# Patient Record
Sex: FEMALE | Race: WHITE | NOT HISPANIC OR LATINO
[De-identification: names, ages, dates, MRNs, and addresses within clinical notes are randomized per-mention and may not be internally consistent; named-entity substitution may affect disease eponyms.]

---

## 2019-05-02 ENCOUNTER — TRANSCRIPTION ENCOUNTER (OUTPATIENT)
Age: 49
End: 2019-05-02

## 2019-05-22 ENCOUNTER — TRANSCRIPTION ENCOUNTER (OUTPATIENT)
Age: 49
End: 2019-05-22

## 2019-06-04 ENCOUNTER — TRANSCRIPTION ENCOUNTER (OUTPATIENT)
Age: 49
End: 2019-06-04

## 2019-12-16 ENCOUNTER — TRANSCRIPTION ENCOUNTER (OUTPATIENT)
Age: 49
End: 2019-12-16

## 2019-12-31 ENCOUNTER — TRANSCRIPTION ENCOUNTER (OUTPATIENT)
Age: 49
End: 2019-12-31

## 2020-07-24 DIAGNOSIS — Z01.818 ENCOUNTER FOR OTHER PREPROCEDURAL EXAMINATION: ICD-10-CM

## 2020-07-26 ENCOUNTER — APPOINTMENT (OUTPATIENT)
Dept: DISASTER EMERGENCY | Facility: CLINIC | Age: 50
End: 2020-07-26

## 2020-07-27 LAB — SARS-COV-2 N GENE NPH QL NAA+PROBE: NOT DETECTED

## 2020-08-30 ENCOUNTER — TRANSCRIPTION ENCOUNTER (OUTPATIENT)
Age: 50
End: 2020-08-30

## 2021-06-30 ENCOUNTER — TRANSCRIPTION ENCOUNTER (OUTPATIENT)
Age: 51
End: 2021-06-30

## 2022-07-04 ENCOUNTER — FORM ENCOUNTER (OUTPATIENT)
Age: 52
End: 2022-07-04

## 2022-07-11 ENCOUNTER — APPOINTMENT (OUTPATIENT)
Dept: ORTHOPEDIC SURGERY | Facility: CLINIC | Age: 52
End: 2022-07-11

## 2022-07-11 VITALS — BODY MASS INDEX: 24.94 KG/M2 | HEIGHT: 60 IN | WEIGHT: 127 LBS

## 2022-07-11 DIAGNOSIS — C50.919 MALIGNANT NEOPLASM OF UNSPECIFIED SITE OF UNSPECIFIED FEMALE BREAST: ICD-10-CM

## 2022-07-11 DIAGNOSIS — Z91.09 OTHER ALLERGY STATUS, OTHER THAN TO DRUGS AND BIOLOGICAL SUBSTANCES: ICD-10-CM

## 2022-07-11 DIAGNOSIS — Z86.59 PERSONAL HISTORY OF OTHER MENTAL AND BEHAVIORAL DISORDERS: ICD-10-CM

## 2022-07-11 DIAGNOSIS — Z78.9 OTHER SPECIFIED HEALTH STATUS: ICD-10-CM

## 2022-07-11 DIAGNOSIS — D80.1 NONFAMILIAL HYPOGAMMAGLOBULINEMIA: ICD-10-CM

## 2022-07-11 PROCEDURE — 20610 DRAIN/INJ JOINT/BURSA W/O US: CPT

## 2022-07-11 PROCEDURE — 73560 X-RAY EXAM OF KNEE 1 OR 2: CPT | Mod: RT

## 2022-07-11 PROCEDURE — 99213 OFFICE O/P EST LOW 20 MIN: CPT | Mod: 25

## 2022-07-11 RX ORDER — POTASSIUM CHLORIDE 750 MG/1
10 TABLET, FILM COATED, EXTENDED RELEASE ORAL
Qty: 150 | Refills: 0 | Status: COMPLETED | COMMUNITY
Start: 2021-11-10

## 2022-07-11 RX ORDER — GABAPENTIN 300 MG/1
300 CAPSULE ORAL
Qty: 30 | Refills: 0 | Status: ACTIVE | COMMUNITY
Start: 2022-04-18

## 2022-07-11 RX ORDER — LEUPROLIDE ACETATE 7.5 MG
7.5 KIT INTRAMUSCULAR
Qty: 1 | Refills: 0 | Status: ACTIVE | COMMUNITY
Start: 2022-05-10

## 2022-07-11 RX ORDER — DIAZEPAM 10 MG/1
10 TABLET ORAL
Qty: 90 | Refills: 0 | Status: ACTIVE | COMMUNITY
Start: 2022-07-07

## 2022-07-11 RX ORDER — ZOLPIDEM TARTRATE 10 MG/1
10 TABLET ORAL
Qty: 30 | Refills: 0 | Status: ACTIVE | COMMUNITY
Start: 2022-03-28

## 2022-07-11 RX ORDER — ANASTROZOLE TABLETS 1 MG/1
1 TABLET ORAL
Qty: 90 | Refills: 0 | Status: ACTIVE | COMMUNITY
Start: 2022-02-08

## 2022-07-11 RX ORDER — POTASSIUM CHLORIDE 1500 MG/1
20 TABLET, FILM COATED, EXTENDED RELEASE ORAL
Qty: 120 | Refills: 0 | Status: ACTIVE | COMMUNITY
Start: 2022-06-17

## 2022-07-11 RX ORDER — HYDROXYZINE HYDROCHLORIDE 50 MG/1
50 TABLET ORAL
Qty: 60 | Refills: 0 | Status: COMPLETED | COMMUNITY
Start: 2022-04-11

## 2022-07-11 RX ORDER — ZIPRASIDONE HYDROCHLORIDE 80 MG/1
80 CAPSULE ORAL
Qty: 30 | Refills: 0 | Status: ACTIVE | COMMUNITY
Start: 2022-03-28

## 2022-07-11 RX ORDER — BUPROPION HYDROCHLORIDE 300 MG/1
300 TABLET, EXTENDED RELEASE ORAL
Qty: 30 | Refills: 0 | Status: ACTIVE | COMMUNITY
Start: 2022-05-24

## 2022-07-11 RX ORDER — DENOSUMAB 60 MG/ML
60 INJECTION SUBCUTANEOUS
Qty: 1 | Refills: 0 | Status: ACTIVE | COMMUNITY
Start: 2021-10-19

## 2022-07-11 RX ORDER — METHOCARBAMOL 500 MG/1
500 TABLET, FILM COATED ORAL
Qty: 20 | Refills: 0 | Status: ACTIVE | COMMUNITY
Start: 2022-04-01

## 2022-07-11 RX ORDER — OXYCODONE AND ACETAMINOPHEN 5; 325 MG/1; MG/1
5-325 TABLET ORAL
Qty: 30 | Refills: 0 | Status: COMPLETED | COMMUNITY
Start: 2022-04-01

## 2022-07-11 RX ORDER — ONDANSETRON 4 MG/1
4 TABLET, ORALLY DISINTEGRATING ORAL
Qty: 9 | Refills: 0 | Status: COMPLETED | COMMUNITY
Start: 2022-03-16

## 2022-07-11 RX ORDER — FLUOXETINE HYDROCHLORIDE 40 MG/1
40 CAPSULE ORAL
Qty: 30 | Refills: 0 | Status: ACTIVE | COMMUNITY
Start: 2022-03-28

## 2022-07-11 NOTE — REASON FOR VISIT
[FreeTextEntry2] : Patient has noted Right knee pain for several yrs but recently worse over the past 3 weeks after she started a move.\par She notes pain med/lateral aspect of the RT knee. She states she did fall onto the knee 2 weeks ago. She saw [facility], was sent for xrays and MRI of the knee. She tried knee sleeve

## 2022-07-11 NOTE — PROCEDURE
[Right] : of the right [Knee] : knee [Pain] : pain [Inflammation] : inflammation [X-ray evidence of Osteoarthritis on this or prior visit] : x-ray evidence of Osteoarthritis on this or prior visit [Betadine] : betadine [Ethyl Chloride sprayed topically] : ethyl chloride sprayed topically [Sterile technique used] : sterile technique used [___ cc    6mg] :  Betamethasone (Celestone) ~Vcc of 6mg [___ cc    1%] : Lidocaine ~Vcc of 1%  [] : Patient tolerated procedure well [Call if redness, pain or fever occur] : call if redness, pain or fever occur [Apply ice for 15min out of every hour for the next 12-24 hours as tolerated] : apply ice for 15 minutes out of every hour for the next 12-24 hours as tolerated [Patient was advised to rest the joint(s) for ____ days] : patient was advised to rest the joint(s) for [unfilled] days [Previous OTC use and PT nontherapeutic] : patient has tried OTC's including aspirin, Ibuprofen, Aleve, etc or prescription NSAIDS, and/or exercises at home and/or physical therapy without satisfactory response [Patient had decreased mobility in the joint] : patient had decreased mobility in the joint [Risks, benefits, alternatives discussed / Verbal consent obtained] : the risks benefits, and alternatives have been discussed, and verbal consent was obtained

## 2022-07-11 NOTE — PHYSICAL EXAM
[NL (0)] : extension 0 degrees [5___] : hamstring 5[unfilled]/5 [Negative] : negative Murali's [] : mildly antalgic [Right] : right knee [AP] : anteroposterior [Lateral] : lateral [[place]] : skyline [Degenerative change] : Degenerative change [AVN] : AVN [TWNoteComboBox7] : flexion 125 degrees

## 2022-07-11 NOTE — ASSESSMENT
[FreeTextEntry1] : Options were discussed with patient including PT, CSI. The plan at this time is to go forward with CSI in the right kNEE. Patient tolerated procedure well. \par \par f/u 1 month\par \par Entered by Bradly Justice acting as scribe.\par Dr. Patel Attestation\par The documentation recorded by the scribe, in my presence, accurately reflects the service I, Dr. Patel, personally performed, and the decisions made by me with my edits as appropriate.\par

## 2022-07-11 NOTE — HISTORY OF PRESENT ILLNESS
[Gradual] : gradual [10] : 10 [8] : 8 [Burning] : burning [Localized] : localized [Stabbing] : stabbing [Throbbing] : throbbing [Constant] : constant [Household chores] : household chores [Leisure] : leisure [Work] : work [Sleep] : sleep [Nothing helps with pain getting better] : Nothing helps with pain getting better [Standing] : standing [Walking] : walking [Stairs] : stairs [Disabled] : Work status: disabled [de-identified] : PT is following up on RT knee pain. It started up again over the past 2 weeks. NKI. Has pain with bending knees, walking, stairs. Had good relief from CSI at last apt 7/19/2021 [] : no [FreeTextEntry1] : RT knee

## 2022-07-25 ENCOUNTER — APPOINTMENT (OUTPATIENT)
Dept: ORTHOPEDIC SURGERY | Facility: CLINIC | Age: 52
End: 2022-07-25

## 2022-07-25 ENCOUNTER — TRANSCRIPTION ENCOUNTER (OUTPATIENT)
Age: 52
End: 2022-07-25

## 2022-07-25 PROCEDURE — 99213 OFFICE O/P EST LOW 20 MIN: CPT

## 2022-07-25 RX ORDER — TRAMADOL HYDROCHLORIDE 50 MG/1
50 TABLET, COATED ORAL DAILY
Qty: 10 | Refills: 0 | Status: COMPLETED | COMMUNITY
Start: 2022-07-25 | End: 2022-08-04

## 2022-07-25 NOTE — ASSESSMENT
[FreeTextEntry1] :  Lengthy discussion regarding options was had with the patient. Nonsurgical options including but not limited to cortisone,viscosupplementation, anti-inflammatory medications, activity modification, non impact exercise /maintaining a healthy BMI, bracing, and icing were reviewed. Surgical options including but not limited to arthroscopy, and joint replacement were discussed as was risks, benefits and alternatives. All questions were answered. \par \par At this time, I rec that the patient thinks about going forward with TKA of the right knee after previously unsuccessful injections. I advised patient to f/u with Dr. Camarena or Dr. Hyatt to further discuss going forward with Rt. TKA\par \par f/u 1 month\par \par Entered by Bradly Justice acting as scribe.\par Dr. Patel Attestation\par The documentation recorded by the scribe, in my presence, accurately reflects the service I, Dr. Patel, personally performed, and the decisions made by me with my edits as appropriate.\par

## 2022-07-25 NOTE — PHYSICAL EXAM
[NL (0)] : extension 0 degrees [5___] : hamstring 5[unfilled]/5 [Negative] : negative Murali's [Right] : right knee [AP] : anteroposterior [Lateral] : lateral [Ong] : skyline [Degenerative change] : Degenerative change [AVN] : AVN [] : no deformities of patellar tendon [FreeTextEntry3] : Quad atrophy [TWNoteComboBox7] : flexion 125 degrees

## 2022-07-25 NOTE — HISTORY OF PRESENT ILLNESS
[Gradual] : gradual [10] : 10 [8] : 8 [Burning] : burning [Localized] : localized [Stabbing] : stabbing [Throbbing] : throbbing [Constant] : constant [Household chores] : household chores [Leisure] : leisure [Work] : work [Sleep] : sleep [Nothing helps with pain getting better] : Nothing helps with pain getting better [Standing] : standing [Walking] : walking [Stairs] : stairs [Disabled] : Work status: disabled [de-identified] : PT is following up on RT knee pain. It started up again over the past 2 weeks. NKI. Has pain with bending knees, walking, stairs. Did not have good relief from CSI on 7/11/22. Pain is 10/10 [] : no [FreeTextEntry1] : RT knee

## 2022-08-10 ENCOUNTER — APPOINTMENT (OUTPATIENT)
Dept: ORTHOPEDIC SURGERY | Facility: CLINIC | Age: 52
End: 2022-08-10

## 2022-08-10 PROCEDURE — 99214 OFFICE O/P EST MOD 30 MIN: CPT

## 2022-08-10 NOTE — PHYSICAL EXAM
[FreeTextEntry9] : ap/lat taken in office prior show AVN med/lateral femoral condyle. no significant joint space narrowing noted.

## 2022-08-10 NOTE — DISCUSSION/SUMMARY
[de-identified] : Options were discussed today including oral anti-inflammatories, Physical Therapy, Steroid Injection,hylagan injections or Surgery. The patient had time to ask questions of the different treatment options, including doing nothing and just observing for a finite period of time and re-evaluating in the future\par The Risks, benefits, alternatives and expectations of the proposed procedure, as well as non-operative management, were discussed at length with the patient, as well as the procedure itself and the expected recovery period. The possible need for post operative Physical Therapy was also discussed. The patient was allowed as much tome as necessary to ask all appropriate questions and they were answered to the patient's satisfaction.\par Plan is to proceed with the TKA.

## 2022-08-11 RX ORDER — HYDROCODONE BITARTRATE AND ACETAMINOPHEN 5; 325 MG/1; MG/1
5-325 TABLET ORAL
Qty: 30 | Refills: 0 | Status: ACTIVE | COMMUNITY
Start: 2022-08-11 | End: 1900-01-01

## 2022-08-17 ENCOUNTER — FORM ENCOUNTER (OUTPATIENT)
Age: 52
End: 2022-08-17

## 2022-08-22 ENCOUNTER — APPOINTMENT (OUTPATIENT)
Dept: ORTHOPEDIC SURGERY | Facility: CLINIC | Age: 52
End: 2022-08-22

## 2022-10-06 RX ORDER — HYDROCODONE BITARTRATE AND ACETAMINOPHEN 5; 325 MG/1; MG/1
5-325 TABLET ORAL
Qty: 30 | Refills: 0 | Status: ACTIVE | COMMUNITY
Start: 2022-10-06 | End: 1900-01-01

## 2022-11-04 ENCOUNTER — RESULT REVIEW (OUTPATIENT)
Age: 52
End: 2022-11-04

## 2022-11-06 ENCOUNTER — FORM ENCOUNTER (OUTPATIENT)
Age: 52
End: 2022-11-06

## 2022-11-09 ENCOUNTER — FORM ENCOUNTER (OUTPATIENT)
Age: 52
End: 2022-11-09

## 2022-11-15 ENCOUNTER — APPOINTMENT (OUTPATIENT)
Dept: ORTHOPEDIC SURGERY | Facility: HOSPITAL | Age: 52
End: 2022-11-15

## 2022-11-15 ENCOUNTER — RESULT REVIEW (OUTPATIENT)
Age: 52
End: 2022-11-15

## 2022-11-15 PROCEDURE — 27447 TOTAL KNEE ARTHROPLASTY: CPT | Mod: RT

## 2022-11-15 PROCEDURE — 27447 TOTAL KNEE ARTHROPLASTY: CPT | Mod: AS,RT

## 2022-11-15 PROCEDURE — 20985 CPTR-ASST DIR MS PX: CPT

## 2022-11-30 ENCOUNTER — APPOINTMENT (OUTPATIENT)
Dept: ORTHOPEDIC SURGERY | Facility: CLINIC | Age: 52
End: 2022-11-30

## 2022-11-30 PROCEDURE — 73560 X-RAY EXAM OF KNEE 1 OR 2: CPT | Mod: RT

## 2022-11-30 PROCEDURE — 99024 POSTOP FOLLOW-UP VISIT: CPT

## 2022-11-30 NOTE — HISTORY OF PRESENT ILLNESS
[de-identified] : following up for the right knee. Patient is s/p R TKA 11/15/2022.  Patient states the knee is still painful. She denies any fever chills or drainage from the knee.  She is ambulating with a walker.  She has been taking her ASA BID.  She is using the TEDS.  She has been working with PT at home.

## 2022-11-30 NOTE — PHYSICAL EXAM
[Right] : right knee [Components well fixed, in good position] : Components well fixed, in good position [de-identified] : Constitutional: The patient appears well developed, well nourished.  \par \par  \par \par Neurologic: Coordination is normal. Alert and oriented to time, place and person. No evidence of mood disorder, calm affect.  \par \par  \par \par    RIGHT   KNEE: Inspection of the knee is as follows: moderate effusion and small ecchymosis. no streaking, no erythema, no atrophy, no deformities of the quad tendon and no deformities of patellar tendon.  \par \par  \par \par Inspection of the wound reveals clean and dry incision, no fluctuance, no sign of infection, no erythema and no drainage. Mesh/Sutures were removed. \par \par  \par \par Palpation of the knee is as follows: no increased warmth. \par \par  \par \par Knee Range of Motion is as follows in degrees:   \par \par  \par \par Extension: 3 \par \par Flexion: 90  \par \par  \par \par Strength examination of the knee is as follows:  \par \par Quadriceps strength is 5/5  \par \par Hamstring strength is 5/5  \par \par  \par \par Ligament Stability and Special Test ligamentously stable and no varus or valgus instability. patella tracks well and able to do active straight leg raise without an extensor lag.  \par \par  \par \par Neurological examination of the knee is as follows: light touch is intact throughout.  \par \par  \par \par Gait and function is as follows: mildly antalgic gait.  WALKER\par \par

## 2022-11-30 NOTE — DISCUSSION/SUMMARY
[de-identified] : She had ?cellulitis medial thight Right knee in TCU which resolved. Wounds and knee look great. \par Continue PT push ROM use bone foam. She can stop the ASA and continue the TEDS for 2 more week. f/u in 6 weeks.

## 2022-12-07 ENCOUNTER — FORM ENCOUNTER (OUTPATIENT)
Age: 52
End: 2022-12-07

## 2022-12-18 ENCOUNTER — FORM ENCOUNTER (OUTPATIENT)
Age: 52
End: 2022-12-18

## 2022-12-24 RX ORDER — HYDROMORPHONE HYDROCHLORIDE 2 MG/1
2 TABLET ORAL
Qty: 20 | Refills: 0 | Status: ACTIVE | COMMUNITY
Start: 2022-12-24 | End: 1900-01-01

## 2023-01-06 RX ORDER — HYDROMORPHONE HYDROCHLORIDE 2 MG/1
2 TABLET ORAL
Qty: 30 | Refills: 0 | Status: ACTIVE | COMMUNITY
Start: 2022-12-07 | End: 1900-01-01

## 2023-01-10 ENCOUNTER — APPOINTMENT (OUTPATIENT)
Dept: PAIN MANAGEMENT | Facility: CLINIC | Age: 53
End: 2023-01-10

## 2023-01-13 RX ORDER — HYDROMORPHONE HYDROCHLORIDE 2 MG/1
2 TABLET ORAL
Qty: 28 | Refills: 0 | Status: ACTIVE | COMMUNITY
Start: 2022-11-30 | End: 1900-01-01

## 2023-01-18 ENCOUNTER — APPOINTMENT (OUTPATIENT)
Dept: ORTHOPEDIC SURGERY | Facility: CLINIC | Age: 53
End: 2023-01-18
Payer: MEDICARE

## 2023-01-18 DIAGNOSIS — M87.059 IDIOPATHIC ASEPTIC NECROSIS OF UNSPECIFIED FEMUR: ICD-10-CM

## 2023-01-18 PROCEDURE — 99024 POSTOP FOLLOW-UP VISIT: CPT

## 2023-01-18 RX ORDER — HYDROCODONE BITARTRATE AND ACETAMINOPHEN 5; 325 MG/1; MG/1
5-325 TABLET ORAL
Qty: 30 | Refills: 0 | Status: ACTIVE | COMMUNITY
Start: 2023-01-18 | End: 1900-01-01

## 2023-01-18 NOTE — HISTORY OF PRESENT ILLNESS
[de-identified] : following up for the right knee. Patient is s/p R TKA 11/15/2022.Patient notes less pain at the knee but she still notes pain with PT.  She notes flexion to 105 degrees.

## 2023-01-18 NOTE — PHYSICAL EXAM
[de-identified] : Constitutional: The patient appears well developed, well nourished.  \par \par  \par \par Neurologic: Coordination is normal. Alert and oriented to time, place and person. No evidence of mood disorder, calm affect.  \par \par  \par \par    RIGHT   KNEE: Inspection of the knee is as follows: moderate effusion and NO ecchymosis. no streaking, no erythema, no atrophy, no deformities of the quad tendon and no deformities of patellar tendon.  \par \par  \par \par Inspection of the wound reveals  WOUNDS ARE WELL HEALED.\par \par  \par \par Palpation of the knee is as follows: no increased warmth. \par \par  \par \par Knee Range of Motion is as follows in degrees:   \par \par  \par \par Extension: 0 \par \par Flexion: 100  \par \par  \par \par Strength examination of the knee is as follows:  \par \par Quadriceps strength is 5/5  \par \par Hamstring strength is 5/5  \par \par  \par \par Ligament Stability and Special Test ligamentously stable and no varus or valgus instability. patella tracks well and able to do active straight leg raise without an extensor lag.  \par \par  \par \par Neurological examination of the knee is as follows: light touch is intact throughout.  \par \par  \par \par Gait and function is as follows: mildly antalgic gait.  CANE\par \par

## 2023-03-15 ENCOUNTER — APPOINTMENT (OUTPATIENT)
Dept: ORTHOPEDIC SURGERY | Facility: CLINIC | Age: 53
End: 2023-03-15
Payer: MEDICARE

## 2023-03-15 DIAGNOSIS — M17.11 UNILATERAL PRIMARY OSTEOARTHRITIS, RIGHT KNEE: ICD-10-CM

## 2023-03-15 PROCEDURE — 99213 OFFICE O/P EST LOW 20 MIN: CPT

## 2023-03-15 NOTE — DISCUSSION/SUMMARY
[de-identified] : Discussed importance of non impact exercises and stretching\par Patient needs to continue to work on ROM\par Recommended use of bone foam \par Continue to push with PT no restrictions. \par f/u in 2 mos. \par \par Entered by Nohemi Ambriz acting as scribe. \par Dr. Camarena Attestation\par The documentation recorded by the scribe, in my presence, accurately reflects the service I, Dr. Camarena, personally performed, and the decisions made by me with my edits as appropriate.\par

## 2023-03-15 NOTE — HISTORY OF PRESENT ILLNESS
[de-identified] : following up for the right knee. Patient is s/p R TKA 11/15/2022.  Patient states overall the knee is improving slowly.  She is working with PT and states she got to 110 degrees flexion today.  She is ambulating without assistive device.  She is slow to climb stairs.

## 2023-03-15 NOTE — PHYSICAL EXAM
[de-identified] : Constitutional: The patient appears well developed, well nourished.  \par \par  \par \par Neurologic: Coordination is normal. Alert and oriented to time, place and person. No evidence of mood disorder, calm affect.  \par \par  \par \par    RIGHT   KNEE: Inspection of the knee is as follows: moderate effusion and NO ecchymosis. no streaking, no erythema, no atrophy, no deformities of the quad tendon and no deformities of patellar tendon.  \par \par  \par \par Inspection of the wound reveals  WOUNDS ARE WELL HEALED.\par \par  \par \par Palpation of the knee is as follows: no increased warmth. \par \par  \par \par Knee Range of Motion is as follows in degrees:   \par \par  \par \par Extension: 0 \par \par Flexion: 95  \par \par  \par \par Strength examination of the knee is as follows:  \par \par Quadriceps strength is 5/5  \par \par Hamstring strength is 5/5  \par \par  \par \par Ligament Stability and Special Test ligamentously stable and no varus or valgus instability. patella tracks well and able to do active straight leg raise without an extensor lag.  \par \par  \par \par Neurological examination of the knee is as follows: light touch is intact throughout.  \par \par  \par \par Gait and function is as follows: mildly antalgic gait. WITHOUT ASSISTIVE DEVICE\par \par